# Patient Record
Sex: FEMALE | Race: WHITE | NOT HISPANIC OR LATINO | Employment: FULL TIME | URBAN - METROPOLITAN AREA
[De-identification: names, ages, dates, MRNs, and addresses within clinical notes are randomized per-mention and may not be internally consistent; named-entity substitution may affect disease eponyms.]

---

## 2023-02-23 PROBLEM — B37.2 INTERTRIGINOUS CANDIDIASIS: Status: ACTIVE | Noted: 2023-02-23

## 2023-02-23 PROBLEM — B00.9 HERPES: Status: ACTIVE | Noted: 2023-02-23

## 2023-02-23 PROBLEM — N89.8 VAGINAL IRRITATION: Status: ACTIVE | Noted: 2023-02-23

## 2023-02-23 PROBLEM — N92.6 MISSED PERIOD: Status: ACTIVE | Noted: 2023-02-23

## 2023-02-23 PROBLEM — N92.6 IRREGULAR BLEEDING: Status: ACTIVE | Noted: 2023-02-23

## 2023-02-23 PROBLEM — B95.0 STREPTOCOCCUS INFECTION, GROUP A: Status: ACTIVE | Noted: 2023-02-23

## 2023-02-23 PROBLEM — R19.7 DIARRHEA: Status: ACTIVE | Noted: 2023-02-23

## 2023-02-23 PROBLEM — M95.0 NASAL DEFECT: Status: ACTIVE | Noted: 2023-02-23

## 2023-02-23 PROBLEM — L73.9 FOLLICULITIS: Status: ACTIVE | Noted: 2023-02-23

## 2023-02-23 PROBLEM — S05.00XA CORNEAL ABRASION: Status: ACTIVE | Noted: 2023-02-23

## 2023-02-23 PROBLEM — F41.9 ANXIETY: Status: ACTIVE | Noted: 2023-02-23

## 2023-02-23 PROBLEM — N91.2 AMENORRHEA: Status: ACTIVE | Noted: 2023-02-23

## 2023-02-23 PROBLEM — F41.0 PANIC ATTACKS: Status: ACTIVE | Noted: 2023-02-23

## 2023-02-23 PROBLEM — J45.909 ASTHMA (HHS-HCC): Status: ACTIVE | Noted: 2023-02-23

## 2023-02-23 PROBLEM — A74.9 CHLAMYDIA: Status: ACTIVE | Noted: 2023-02-23

## 2023-02-23 PROBLEM — R10.33: Status: ACTIVE | Noted: 2023-02-23

## 2023-02-23 PROBLEM — R21 RASH: Status: ACTIVE | Noted: 2023-02-23

## 2023-02-23 PROBLEM — N89.8 VAGINAL DISCHARGE: Status: ACTIVE | Noted: 2023-02-23

## 2023-02-23 PROBLEM — J02.9 SORE THROAT: Status: ACTIVE | Noted: 2023-02-23

## 2023-02-23 PROBLEM — B35.4 RINGWORM OF BODY: Status: ACTIVE | Noted: 2023-02-23

## 2023-02-23 PROBLEM — R00.2 PALPITATIONS: Status: ACTIVE | Noted: 2023-02-23

## 2023-02-23 PROBLEM — K21.9 ACID REFLUX DISEASE: Status: ACTIVE | Noted: 2023-02-23

## 2023-02-23 RX ORDER — CLOTRIMAZOLE AND BETAMETHASONE DIPROPIONATE 10; .64 MG/G; MG/G
1 CREAM TOPICAL 3 TIMES DAILY
COMMUNITY
End: 2023-03-31 | Stop reason: SDUPTHER

## 2023-02-23 RX ORDER — ALBUTEROL SULFATE 90 UG/1
1-2 AEROSOL, METERED RESPIRATORY (INHALATION)
COMMUNITY
Start: 2022-04-11

## 2023-02-23 RX ORDER — DEXAMETHASONE 6 MG/1
6 TABLET ORAL DAILY
COMMUNITY
End: 2023-03-31

## 2023-03-22 ENCOUNTER — APPOINTMENT (OUTPATIENT)
Dept: PRIMARY CARE | Facility: CLINIC | Age: 28
End: 2023-03-22

## 2023-03-31 ENCOUNTER — OFFICE VISIT (OUTPATIENT)
Dept: PRIMARY CARE | Facility: CLINIC | Age: 28
End: 2023-03-31
Payer: MEDICAID

## 2023-03-31 VITALS
BODY MASS INDEX: 36.76 KG/M2 | SYSTOLIC BLOOD PRESSURE: 110 MMHG | OXYGEN SATURATION: 98 % | DIASTOLIC BLOOD PRESSURE: 70 MMHG | HEIGHT: 70 IN | WEIGHT: 256.8 LBS | RESPIRATION RATE: 18 BRPM | HEART RATE: 86 BPM

## 2023-03-31 DIAGNOSIS — J45.20 MILD INTERMITTENT ASTHMA, UNSPECIFIED WHETHER COMPLICATED (HHS-HCC): ICD-10-CM

## 2023-03-31 DIAGNOSIS — R73.01 ELEVATED FASTING BLOOD SUGAR: ICD-10-CM

## 2023-03-31 DIAGNOSIS — F41.0 PANIC ATTACKS: ICD-10-CM

## 2023-03-31 DIAGNOSIS — R07.89 OTHER CHEST PAIN: ICD-10-CM

## 2023-03-31 DIAGNOSIS — B35.9 RINGWORM: ICD-10-CM

## 2023-03-31 DIAGNOSIS — Z13.220 SCREENING FOR HYPERLIPIDEMIA: ICD-10-CM

## 2023-03-31 DIAGNOSIS — E55.9 VITAMIN D DEFICIENCY: Primary | ICD-10-CM

## 2023-03-31 DIAGNOSIS — Z13.29 SCREENING FOR HYPOTHYROIDISM: ICD-10-CM

## 2023-03-31 DIAGNOSIS — R07.9 CHEST PAIN, UNSPECIFIED TYPE: ICD-10-CM

## 2023-03-31 DIAGNOSIS — F41.9 ANXIETY: ICD-10-CM

## 2023-03-31 DIAGNOSIS — Z09 FOLLOW UP: ICD-10-CM

## 2023-03-31 DIAGNOSIS — E53.8 VITAMIN B 12 DEFICIENCY: ICD-10-CM

## 2023-03-31 PROBLEM — B00.9 HERPES: Status: RESOLVED | Noted: 2023-02-23 | Resolved: 2023-03-31

## 2023-03-31 PROBLEM — B37.2 INTERTRIGINOUS CANDIDIASIS: Status: RESOLVED | Noted: 2023-02-23 | Resolved: 2023-03-31

## 2023-03-31 PROBLEM — N89.8 VAGINAL IRRITATION: Status: RESOLVED | Noted: 2023-02-23 | Resolved: 2023-03-31

## 2023-03-31 PROBLEM — R21 RASH: Status: RESOLVED | Noted: 2023-02-23 | Resolved: 2023-03-31

## 2023-03-31 PROBLEM — A74.9 CHLAMYDIA: Status: RESOLVED | Noted: 2023-02-23 | Resolved: 2023-03-31

## 2023-03-31 PROBLEM — J02.9 SORE THROAT: Status: RESOLVED | Noted: 2023-02-23 | Resolved: 2023-03-31

## 2023-03-31 PROBLEM — B35.4 RINGWORM OF BODY: Status: RESOLVED | Noted: 2023-02-23 | Resolved: 2023-03-31

## 2023-03-31 PROBLEM — B95.0 STREPTOCOCCUS INFECTION, GROUP A: Status: RESOLVED | Noted: 2023-02-23 | Resolved: 2023-03-31

## 2023-03-31 PROBLEM — K21.9 ACID REFLUX DISEASE: Status: RESOLVED | Noted: 2023-02-23 | Resolved: 2023-03-31

## 2023-03-31 PROBLEM — L73.9 FOLLICULITIS: Status: RESOLVED | Noted: 2023-02-23 | Resolved: 2023-03-31

## 2023-03-31 PROBLEM — S05.00XA CORNEAL ABRASION: Status: RESOLVED | Noted: 2023-02-23 | Resolved: 2023-03-31

## 2023-03-31 PROBLEM — R10.33: Status: RESOLVED | Noted: 2023-02-23 | Resolved: 2023-03-31

## 2023-03-31 PROBLEM — N89.8 VAGINAL DISCHARGE: Status: RESOLVED | Noted: 2023-02-23 | Resolved: 2023-03-31

## 2023-03-31 PROBLEM — M95.0 NASAL DEFECT: Status: RESOLVED | Noted: 2023-02-23 | Resolved: 2023-03-31

## 2023-03-31 PROBLEM — N92.6 MISSED PERIOD: Status: RESOLVED | Noted: 2023-02-23 | Resolved: 2023-03-31

## 2023-03-31 PROBLEM — N91.2 AMENORRHEA: Status: RESOLVED | Noted: 2023-02-23 | Resolved: 2023-03-31

## 2023-03-31 PROBLEM — R19.7 DIARRHEA: Status: RESOLVED | Noted: 2023-02-23 | Resolved: 2023-03-31

## 2023-03-31 PROBLEM — R00.2 PALPITATIONS: Status: RESOLVED | Noted: 2023-02-23 | Resolved: 2023-03-31

## 2023-03-31 PROBLEM — N92.6 IRREGULAR BLEEDING: Status: RESOLVED | Noted: 2023-02-23 | Resolved: 2023-03-31

## 2023-03-31 PROCEDURE — 99214 OFFICE O/P EST MOD 30 MIN: CPT

## 2023-03-31 PROCEDURE — 1036F TOBACCO NON-USER: CPT

## 2023-03-31 RX ORDER — CLOTRIMAZOLE AND BETAMETHASONE DIPROPIONATE 10; .64 MG/G; MG/G
1 CREAM TOPICAL 3 TIMES DAILY
Qty: 10 G | Refills: 0 | Status: SHIPPED | OUTPATIENT
Start: 2023-03-31

## 2023-03-31 ASSESSMENT — ENCOUNTER SYMPTOMS
GASTROINTESTINAL NEGATIVE: 1
CARDIOVASCULAR NEGATIVE: 1
NEUROLOGICAL NEGATIVE: 1
ALLERGIC/IMMUNOLOGIC NEGATIVE: 1
PSYCHIATRIC NEGATIVE: 1
EYES NEGATIVE: 1
ENDOCRINE NEGATIVE: 1
MUSCULOSKELETAL NEGATIVE: 1
CONSTITUTIONAL NEGATIVE: 1
HEMATOLOGIC/LYMPHATIC NEGATIVE: 1
RESPIRATORY NEGATIVE: 1

## 2023-03-31 ASSESSMENT — PATIENT HEALTH QUESTIONNAIRE - PHQ9
SUM OF ALL RESPONSES TO PHQ9 QUESTIONS 1 AND 2: 0
2. FEELING DOWN, DEPRESSED OR HOPELESS: NOT AT ALL
1. LITTLE INTEREST OR PLEASURE IN DOING THINGS: NOT AT ALL

## 2023-03-31 NOTE — PROGRESS NOTES
"Subjective   Patient ID: Ag Rodriguez is a 27 y.o. female who presents for Follow-up.    HPI a 27-year-old female with past medical history of chronic chest pain, ringworm, anxiety and panic attacks, and asthma arrives to the clinic with chief complaint of patient establishment.  Patient is here to establish with a new provider today.  She has not been seen in this office in over a year.  She does have a few concerns that she would like to address today.  She does have a history of ringworm and is wondering if she can get a refill on her prescription cream medication.  She does use a local tanning bed and reports that she would always get these \"spots\" after using it.  She is also requesting a letter for an emotional support dog letter so that her animal can stay at her apartment.  She also reports of having chronic chest pain that is midsternal that would happen at random times.  She denies any illicit drug use and caffeine.  She does not have any history of any cardiovascular disease.  She does have a history of anxiety and panic attacks.  She reports that this is not related to that.  She is here for further evaluation and health maintenance.  Patient denies any chest pain, shortness of breath, diarrhea, fevers, chills, head pain, COVID-like symptoms.    Review of Systems   Constitutional: Negative.    HENT: Negative.     Eyes: Negative.    Respiratory: Negative.     Cardiovascular: Negative.    Gastrointestinal: Negative.    Endocrine: Negative.    Genitourinary: Negative.    Musculoskeletal: Negative.    Skin: Negative.    Allergic/Immunologic: Negative.    Neurological: Negative.    Hematological: Negative.    Psychiatric/Behavioral: Negative.     All other systems reviewed and are negative.      Objective   /70 (BP Location: Right arm)   Pulse 86   Resp 18   Ht 1.778 m (5' 10\")   Wt 116 kg (256 lb 12.8 oz)   SpO2 98%   BMI 36.85 kg/m²     Physical Exam  Vitals and nursing note reviewed. "   Constitutional:       Appearance: Normal appearance.   HENT:      Head: Normocephalic and atraumatic.      Right Ear: Tympanic membrane normal.      Left Ear: Tympanic membrane normal.      Nose: Nose normal.      Mouth/Throat:      Mouth: Mucous membranes are moist.      Pharynx: Oropharynx is clear.   Eyes:      Extraocular Movements: Extraocular movements intact.      Conjunctiva/sclera: Conjunctivae normal.      Pupils: Pupils are equal, round, and reactive to light.   Cardiovascular:      Rate and Rhythm: Normal rate and regular rhythm.   Pulmonary:      Effort: Pulmonary effort is normal.      Breath sounds: Normal breath sounds.   Abdominal:      General: Bowel sounds are normal.      Palpations: Abdomen is soft.          Comments: Ring worm noted.    Musculoskeletal:         General: Normal range of motion.      Cervical back: Normal range of motion and neck supple.   Skin:     General: Skin is warm.      Capillary Refill: Capillary refill takes less than 2 seconds.   Neurological:      General: No focal deficit present.      Mental Status: She is alert and oriented to person, place, and time. Mental status is at baseline.   Psychiatric:         Mood and Affect: Mood normal.         Behavior: Behavior normal.         Thought Content: Thought content normal.         Judgment: Judgment normal.         Assessment/Plan   Problem List Items Addressed This Visit          Respiratory    Asthma     Continue albuterol inhaler 1 to 2 puffs every 4-6 hours as needed.            Infectious/Inflammatory    Ringworm     You report getting ringworm and having a history of ringworm after using the local tanning bed.  I have sent over your clotrimazole betamethasone cream as requested.  I discussed about healthy hand and body hygiene.         Relevant Medications    clotrimazole-betamethasone (Lotrisone) cream       Other    Anxiety     You report having anxiety and panic attacks.  You do not want to take any medications.   I did write your dog emotional support letter today as requested.         Panic attacks     You report having anxiety and panic attacks.  You do not want to take any medications.  I did write your dog emotional support letter today as requested.         Other chest pain     I have ordered blood work for you to complete.  I did offer an EKG and you declined at this time since you report that your chest pain has been ongoing for the last 3 months.  He also report that you have gained weight and you do not know if the chest pain is related to your breast getting bigger.  I also ordered a Holter monitor to monitor your heart rate and rhythm for 24 to 48 hours.  Please pick that up today.  I will reevaluate your Holter monitor in 2 to 3 weeks.          Other Visit Diagnoses       Vitamin D deficiency    -  Primary    Relevant Orders    Comprehensive Metabolic Panel    CBC    Vitamin D, Total    Vitamin B12    Vitamin B 12 deficiency        Relevant Orders    Comprehensive Metabolic Panel    Vitamin B12    Elevated fasting blood sugar        Relevant Orders    Comprehensive Metabolic Panel    Hemoglobin A1C    Screening for hyperlipidemia        Relevant Orders    Lipid Panel    Screening for hypothyroidism        Relevant Orders    TSH with reflex to Free T4 if abnormal    Chest pain, unspecified type        Relevant Orders    Holter or Event Cardiac Monitor    Follow up        Relevant Orders    Follow Up In [facility] - PCP          It was a pleasure seeing in the office today.    Follow-up in 2 months.  Please complete the Holter monitor and blood work prior to this appointment.  These labs require you to fast.    I also advised you to follow low fat diet and exercise for at least 30 minutes daily.    Anticipatory guidance, age appropriate vaccines, screening exams, health promotion and prevention discussed.    This document was generated using the assistance of voice recognition software. If there are any  errors of spelling, grammar, syntax, or meaning; please feel free to contact me directly for clarification.

## 2023-03-31 NOTE — LETTER
To Whom It May Concern:       Ag Rodriguez is my patient, and has been under my care for many years.   I am very familiar with her history and with the functional limitations imposed by her emotional related illness.    Due to this disability, this patient has certain limitations related to coping with stress and anxiety. In order to help alleviate these difficulties, and to enhance his/her ability to function independently, I have prescribed this patient to obtain a pet or emotional support animal. The presence of this animal is necessary for the patient's emotional and mental health because its presence will mitigate the symptoms he/she is currently experiencing.    I am licensed by the [facility] to practice medicine and specialize in Family Medicine My license number is [id number]    Sincerely,        Viky BARRERA

## 2023-03-31 NOTE — PROGRESS NOTES
Prev alessandra pt to establish.    Patient needs a letter for emotional support dog.  Alessandra did tell her she has anxiety and may be having panic attachs.     2. Patient states she has chest pain-she has had it for years.  she thinks it may be because her breasts are large and states when she lifts them she can breath better.     Rash on left hip area. She has had this in past on other areas of body- previous cream worked well.

## 2023-03-31 NOTE — ASSESSMENT & PLAN NOTE
You report having anxiety and panic attacks.  You do not want to take any medications.  I did write your dog emotional support letter today as requested.

## 2023-03-31 NOTE — PATIENT INSTRUCTIONS
2 months with fasting labs    Cream for Ring Worm was sent to your pharmacy in [place].     Dog letter was printed out for you today as well     Holter monitor today as well.

## 2023-03-31 NOTE — ASSESSMENT & PLAN NOTE
You report getting ringworm and having a history of ringworm after using the local tanning bed.  I have sent over your clotrimazole betamethasone cream as requested.  I discussed about healthy hand and body hygiene.

## 2023-03-31 NOTE — ASSESSMENT & PLAN NOTE
I have ordered blood work for you to complete.  I did offer an EKG and you declined at this time since you report that your chest pain has been ongoing for the last 3 months.  He also report that you have gained weight and you do not know if the chest pain is related to your breast getting bigger.  I also ordered a Holter monitor to monitor your heart rate and rhythm for 24 to 48 hours.  Please pick that up today.  I will reevaluate your Holter monitor in 2 to 3 weeks.

## 2023-04-01 ENCOUNTER — LAB (OUTPATIENT)
Dept: LAB | Facility: LAB | Age: 28
End: 2023-04-01
Payer: MEDICAID

## 2023-04-01 DIAGNOSIS — E53.8 VITAMIN B 12 DEFICIENCY: ICD-10-CM

## 2023-04-01 DIAGNOSIS — Z13.220 SCREENING FOR HYPERLIPIDEMIA: ICD-10-CM

## 2023-04-01 DIAGNOSIS — E55.9 VITAMIN D DEFICIENCY: ICD-10-CM

## 2023-04-01 DIAGNOSIS — R73.01 ELEVATED FASTING BLOOD SUGAR: ICD-10-CM

## 2023-04-01 DIAGNOSIS — Z13.29 SCREENING FOR HYPOTHYROIDISM: ICD-10-CM

## 2023-04-01 LAB
ALANINE AMINOTRANSFERASE (SGPT) (U/L) IN SER/PLAS: 21 U/L (ref 7–45)
ALBUMIN (G/DL) IN SER/PLAS: 4.2 G/DL (ref 3.4–5)
ALKALINE PHOSPHATASE (U/L) IN SER/PLAS: 64 U/L (ref 33–110)
ANION GAP IN SER/PLAS: 12 MMOL/L (ref 10–20)
ASPARTATE AMINOTRANSFERASE (SGOT) (U/L) IN SER/PLAS: 15 U/L (ref 9–39)
BILIRUBIN TOTAL (MG/DL) IN SER/PLAS: 0.5 MG/DL (ref 0–1.2)
CALCIDIOL (25 OH VITAMIN D3) (NG/ML) IN SER/PLAS: 28 NG/ML
CALCIUM (MG/DL) IN SER/PLAS: 9 MG/DL (ref 8.6–10.3)
CARBON DIOXIDE, TOTAL (MMOL/L) IN SER/PLAS: 25 MMOL/L (ref 21–32)
CHLORIDE (MMOL/L) IN SER/PLAS: 107 MMOL/L (ref 98–107)
CHOLESTEROL (MG/DL) IN SER/PLAS: 188 MG/DL (ref 0–199)
CHOLESTEROL IN HDL (MG/DL) IN SER/PLAS: 45.3 MG/DL
CHOLESTEROL/HDL RATIO: 4.2
COBALAMIN (VITAMIN B12) (PG/ML) IN SER/PLAS: 185 PG/ML (ref 211–911)
CREATININE (MG/DL) IN SER/PLAS: 0.62 MG/DL (ref 0.5–1.05)
ERYTHROCYTE DISTRIBUTION WIDTH (RATIO) BY AUTOMATED COUNT: 13.6 % (ref 11.5–14.5)
ERYTHROCYTE MEAN CORPUSCULAR HEMOGLOBIN CONCENTRATION (G/DL) BY AUTOMATED: 31.8 G/DL (ref 32–36)
ERYTHROCYTE MEAN CORPUSCULAR VOLUME (FL) BY AUTOMATED COUNT: 88 FL (ref 80–100)
ERYTHROCYTES (10*6/UL) IN BLOOD BY AUTOMATED COUNT: 4.71 X10E12/L (ref 4–5.2)
ESTIMATED AVERAGE GLUCOSE FOR HBA1C: 105 MG/DL
GFR FEMALE: >90 ML/MIN/1.73M2
GLUCOSE (MG/DL) IN SER/PLAS: 86 MG/DL (ref 74–99)
HEMATOCRIT (%) IN BLOOD BY AUTOMATED COUNT: 41.5 % (ref 36–46)
HEMOGLOBIN (G/DL) IN BLOOD: 13.2 G/DL (ref 12–16)
HEMOGLOBIN A1C/HEMOGLOBIN TOTAL IN BLOOD: 5.3 %
LDL: 118 MG/DL (ref 0–99)
LEUKOCYTES (10*3/UL) IN BLOOD BY AUTOMATED COUNT: 7.1 X10E9/L (ref 4.4–11.3)
PLATELETS (10*3/UL) IN BLOOD AUTOMATED COUNT: 313 X10E9/L (ref 150–450)
POTASSIUM (MMOL/L) IN SER/PLAS: 4.3 MMOL/L (ref 3.5–5.3)
PROTEIN TOTAL: 6.5 G/DL (ref 6.4–8.2)
SODIUM (MMOL/L) IN SER/PLAS: 140 MMOL/L (ref 136–145)
THYROTROPIN (MIU/L) IN SER/PLAS BY DETECTION LIMIT <= 0.05 MIU/L: 2.44 MIU/L (ref 0.44–3.98)
TRIGLYCERIDE (MG/DL) IN SER/PLAS: 126 MG/DL (ref 0–149)
UREA NITROGEN (MG/DL) IN SER/PLAS: 8 MG/DL (ref 6–23)
VLDL: 25 MG/DL (ref 0–40)

## 2023-04-01 PROCEDURE — 82306 VITAMIN D 25 HYDROXY: CPT

## 2023-04-01 PROCEDURE — 36415 COLL VENOUS BLD VENIPUNCTURE: CPT

## 2023-04-01 PROCEDURE — 80053 COMPREHEN METABOLIC PANEL: CPT

## 2023-04-01 PROCEDURE — 80061 LIPID PANEL: CPT

## 2023-04-01 PROCEDURE — 84443 ASSAY THYROID STIM HORMONE: CPT

## 2023-04-01 PROCEDURE — 85027 COMPLETE CBC AUTOMATED: CPT

## 2023-04-01 PROCEDURE — 83036 HEMOGLOBIN GLYCOSYLATED A1C: CPT

## 2023-04-01 PROCEDURE — 82607 VITAMIN B-12: CPT

## 2023-04-18 LAB
BASOPHILS (10*3/UL) IN BLOOD BY AUTOMATED COUNT: 0.06 X10E9/L (ref 0–0.1)
BASOPHILS/100 LEUKOCYTES IN BLOOD BY AUTOMATED COUNT: 0.5 % (ref 0–2)
C REACTIVE PROTEIN (MG/L) IN SER/PLAS: 0.61 MG/DL
EOSINOPHILS (10*3/UL) IN BLOOD BY AUTOMATED COUNT: 0.04 X10E9/L (ref 0–0.7)
EOSINOPHILS/100 LEUKOCYTES IN BLOOD BY AUTOMATED COUNT: 0.3 % (ref 0–6)
ERYTHROCYTE DISTRIBUTION WIDTH (RATIO) BY AUTOMATED COUNT: 13.6 % (ref 11.5–14.5)
ERYTHROCYTE MEAN CORPUSCULAR HEMOGLOBIN CONCENTRATION (G/DL) BY AUTOMATED: 32.5 G/DL (ref 32–36)
ERYTHROCYTE MEAN CORPUSCULAR VOLUME (FL) BY AUTOMATED COUNT: 89 FL (ref 80–100)
ERYTHROCYTES (10*6/UL) IN BLOOD BY AUTOMATED COUNT: 4.69 X10E12/L (ref 4–5.2)
HEMATOCRIT (%) IN BLOOD BY AUTOMATED COUNT: 41.9 % (ref 36–46)
HEMOGLOBIN (G/DL) IN BLOOD: 13.6 G/DL (ref 12–16)
IMMATURE GRANULOCYTES/100 LEUKOCYTES IN BLOOD BY AUTOMATED COUNT: 0.3 % (ref 0–0.9)
LEUKOCYTES (10*3/UL) IN BLOOD BY AUTOMATED COUNT: 12.4 X10E9/L (ref 4.4–11.3)
LYMPHOCYTES (10*3/UL) IN BLOOD BY AUTOMATED COUNT: 1.23 X10E9/L (ref 1.2–4.8)
LYMPHOCYTES/100 LEUKOCYTES IN BLOOD BY AUTOMATED COUNT: 10 % (ref 13–44)
MONOCYTES (10*3/UL) IN BLOOD BY AUTOMATED COUNT: 0.29 X10E9/L (ref 0.1–1)
MONOCYTES/100 LEUKOCYTES IN BLOOD BY AUTOMATED COUNT: 2.3 % (ref 2–10)
NEUTROPHILS (10*3/UL) IN BLOOD BY AUTOMATED COUNT: 10.69 X10E9/L (ref 1.2–7.7)
NEUTROPHILS/100 LEUKOCYTES IN BLOOD BY AUTOMATED COUNT: 86.6 % (ref 40–80)
PLATELETS (10*3/UL) IN BLOOD AUTOMATED COUNT: 354 X10E9/L (ref 150–450)
SEDIMENTATION RATE, ERYTHROCYTE: 22 MM/H (ref 0–20)
URATE (MG/DL) IN SER/PLAS: 5.7 MG/DL (ref 2.3–6.7)

## 2023-04-19 LAB
ANTI-NUCLEAR ANTIBODY (ANA): NEGATIVE
RHEUMATOID FACTOR (IU/ML) IN SERUM OR PLASMA: <10 IU/ML (ref 0–15)

## 2023-06-01 ENCOUNTER — APPOINTMENT (OUTPATIENT)
Dept: PRIMARY CARE | Facility: CLINIC | Age: 28
End: 2023-06-01
Payer: MEDICAID

## 2023-06-02 LAB
CHLAMYDIA TRACH., AMPLIFIED: NEGATIVE
CLUE CELLS: NORMAL
N. GONORRHEA, AMPLIFIED: NEGATIVE
NUGENT SCORE: 1
TRICHOMONAS VAGINALIS: NEGATIVE
YEAST: NORMAL

## 2023-07-07 LAB
CHLAMYDIA TRACH., AMPLIFIED: NEGATIVE
N. GONORRHEA, AMPLIFIED: NEGATIVE
TRICHOMONAS VAGINALIS: NEGATIVE

## 2023-07-10 LAB
GENITAL CULTURE: ABNORMAL
GENITAL CULTURE: ABNORMAL

## 2023-10-09 ENCOUNTER — HOSPITAL ENCOUNTER (EMERGENCY)
Facility: HOSPITAL | Age: 28
Discharge: HOME | End: 2023-10-09
Attending: STUDENT IN AN ORGANIZED HEALTH CARE EDUCATION/TRAINING PROGRAM | Admitting: STUDENT IN AN ORGANIZED HEALTH CARE EDUCATION/TRAINING PROGRAM
Payer: MEDICAID

## 2023-10-09 ENCOUNTER — PHARMACY VISIT (OUTPATIENT)
Dept: PHARMACY | Facility: CLINIC | Age: 28
End: 2023-10-09
Payer: MEDICAID

## 2023-10-09 ENCOUNTER — APPOINTMENT (OUTPATIENT)
Dept: RADIOLOGY | Facility: HOSPITAL | Age: 28
End: 2023-10-09
Payer: MEDICAID

## 2023-10-09 VITALS
HEART RATE: 60 BPM | BODY MASS INDEX: 36.37 KG/M2 | HEIGHT: 68 IN | SYSTOLIC BLOOD PRESSURE: 141 MMHG | TEMPERATURE: 99.2 F | WEIGHT: 240 LBS | DIASTOLIC BLOOD PRESSURE: 87 MMHG | RESPIRATION RATE: 18 BRPM | OXYGEN SATURATION: 99 %

## 2023-10-09 DIAGNOSIS — F41.8 ANXIETY ABOUT HEALTH: ICD-10-CM

## 2023-10-09 DIAGNOSIS — R51.9 ACUTE NONINTRACTABLE HEADACHE, UNSPECIFIED HEADACHE TYPE: Primary | ICD-10-CM

## 2023-10-09 LAB
ANION GAP SERPL CALC-SCNC: 11 MMOL/L (ref 10–20)
BASOPHILS # BLD AUTO: 0.04 X10*3/UL (ref 0–0.1)
BASOPHILS NFR BLD AUTO: 0.5 %
BUN SERPL-MCNC: 8 MG/DL (ref 6–23)
CALCIUM SERPL-MCNC: 9 MG/DL (ref 8.6–10.3)
CHLORIDE SERPL-SCNC: 106 MMOL/L (ref 98–107)
CO2 SERPL-SCNC: 25 MMOL/L (ref 21–32)
CREAT SERPL-MCNC: 0.58 MG/DL (ref 0.5–1.05)
EOSINOPHIL # BLD AUTO: 0.13 X10*3/UL (ref 0–0.7)
EOSINOPHIL NFR BLD AUTO: 1.8 %
ERYTHROCYTE [DISTWIDTH] IN BLOOD BY AUTOMATED COUNT: 13.9 % (ref 11.5–14.5)
GFR SERPL CREATININE-BSD FRML MDRD: >90 ML/MIN/1.73M*2
GLUCOSE SERPL-MCNC: 94 MG/DL (ref 74–99)
HCT VFR BLD AUTO: 41.3 % (ref 36–46)
HGB BLD-MCNC: 13.5 G/DL (ref 12–16)
IMM GRANULOCYTES # BLD AUTO: 0.03 X10*3/UL (ref 0–0.7)
IMM GRANULOCYTES NFR BLD AUTO: 0.4 % (ref 0–0.9)
LYMPHOCYTES # BLD AUTO: 2.24 X10*3/UL (ref 1.2–4.8)
LYMPHOCYTES NFR BLD AUTO: 30.7 %
MCH RBC QN AUTO: 28.8 PG (ref 26–34)
MCHC RBC AUTO-ENTMCNC: 32.7 G/DL (ref 32–36)
MCV RBC AUTO: 88 FL (ref 80–100)
MONOCYTES # BLD AUTO: 0.62 X10*3/UL (ref 0.1–1)
MONOCYTES NFR BLD AUTO: 8.5 %
NEUTROPHILS # BLD AUTO: 4.23 X10*3/UL (ref 1.2–7.7)
NEUTROPHILS NFR BLD AUTO: 58.1 %
NRBC BLD-RTO: 0 /100 WBCS (ref 0–0)
PLATELET # BLD AUTO: 335 X10*3/UL (ref 150–450)
PMV BLD AUTO: 10.8 FL (ref 7.5–11.5)
POTASSIUM SERPL-SCNC: 4.3 MMOL/L (ref 3.5–5.3)
RBC # BLD AUTO: 4.68 X10*6/UL (ref 4–5.2)
SODIUM SERPL-SCNC: 138 MMOL/L (ref 136–145)
WBC # BLD AUTO: 7.3 X10*3/UL (ref 4.4–11.3)

## 2023-10-09 PROCEDURE — 96361 HYDRATE IV INFUSION ADD-ON: CPT

## 2023-10-09 PROCEDURE — 80048 BASIC METABOLIC PNL TOTAL CA: CPT | Performed by: NURSE PRACTITIONER

## 2023-10-09 PROCEDURE — RXMED WILLOW AMBULATORY MEDICATION CHARGE

## 2023-10-09 PROCEDURE — 36415 COLL VENOUS BLD VENIPUNCTURE: CPT | Performed by: NURSE PRACTITIONER

## 2023-10-09 PROCEDURE — 2500000001 HC RX 250 WO HCPCS SELF ADMINISTERED DRUGS (ALT 637 FOR MEDICARE OP): Performed by: STUDENT IN AN ORGANIZED HEALTH CARE EDUCATION/TRAINING PROGRAM

## 2023-10-09 PROCEDURE — 85025 COMPLETE CBC W/AUTO DIFF WBC: CPT | Performed by: NURSE PRACTITIONER

## 2023-10-09 PROCEDURE — 70450 CT HEAD/BRAIN W/O DYE: CPT

## 2023-10-09 PROCEDURE — 2500000004 HC RX 250 GENERAL PHARMACY W/ HCPCS (ALT 636 FOR OP/ED): Performed by: NURSE PRACTITIONER

## 2023-10-09 PROCEDURE — 70450 CT HEAD/BRAIN W/O DYE: CPT | Performed by: RADIOLOGY

## 2023-10-09 PROCEDURE — 96374 THER/PROPH/DIAG INJ IV PUSH: CPT

## 2023-10-09 PROCEDURE — 99284 EMERGENCY DEPT VISIT MOD MDM: CPT | Mod: 25

## 2023-10-09 RX ORDER — METOCLOPRAMIDE 5 MG/1
10 TABLET ORAL ONCE
Status: COMPLETED | OUTPATIENT
Start: 2023-10-09 | End: 2023-10-09

## 2023-10-09 RX ORDER — BUTALBITAL, ACETAMINOPHEN AND CAFFEINE 50; 325; 40 MG/1; MG/1; MG/1
1 TABLET ORAL EVERY 6 HOURS PRN
Qty: 12 TABLET | Refills: 0 | Status: SHIPPED | OUTPATIENT
Start: 2023-10-09

## 2023-10-09 RX ORDER — METOCLOPRAMIDE 10 MG/1
10 TABLET ORAL EVERY 8 HOURS PRN
Qty: 20 TABLET | Refills: 0 | Status: SHIPPED | OUTPATIENT
Start: 2023-10-09 | End: 2023-10-16

## 2023-10-09 RX ORDER — IBUPROFEN 600 MG/1
600 TABLET ORAL EVERY 6 HOURS PRN
Qty: 20 TABLET | Refills: 0 | Status: SHIPPED | OUTPATIENT
Start: 2023-10-09 | End: 2023-10-14

## 2023-10-09 RX ORDER — METOCLOPRAMIDE HYDROCHLORIDE 5 MG/ML
10 INJECTION INTRAMUSCULAR; INTRAVENOUS ONCE
Status: COMPLETED | OUTPATIENT
Start: 2023-10-09 | End: 2023-10-09

## 2023-10-09 RX ORDER — NAPROXEN 250 MG/1
500 TABLET ORAL ONCE
Status: COMPLETED | OUTPATIENT
Start: 2023-10-09 | End: 2023-10-09

## 2023-10-09 RX ADMIN — SODIUM CHLORIDE 1000 ML: 9 INJECTION, SOLUTION INTRAVENOUS at 11:16

## 2023-10-09 RX ADMIN — NAPROXEN 500 MG: 250 TABLET ORAL at 13:48

## 2023-10-09 RX ADMIN — METOCLOPRAMIDE 10 MG: 5 TABLET ORAL at 13:47

## 2023-10-09 ASSESSMENT — COLUMBIA-SUICIDE SEVERITY RATING SCALE - C-SSRS
1. IN THE PAST MONTH, HAVE YOU WISHED YOU WERE DEAD OR WISHED YOU COULD GO TO SLEEP AND NOT WAKE UP?: NO
6. HAVE YOU EVER DONE ANYTHING, STARTED TO DO ANYTHING, OR PREPARED TO DO ANYTHING TO END YOUR LIFE?: NO
2. HAVE YOU ACTUALLY HAD ANY THOUGHTS OF KILLING YOURSELF?: NO

## 2023-10-09 ASSESSMENT — LIFESTYLE VARIABLES
HAVE YOU EVER FELT YOU SHOULD CUT DOWN ON YOUR DRINKING: NO
EVER HAD A DRINK FIRST THING IN THE MORNING TO STEADY YOUR NERVES TO GET RID OF A HANGOVER: NO
EVER FELT BAD OR GUILTY ABOUT YOUR DRINKING: NO
HAVE PEOPLE ANNOYED YOU BY CRITICIZING YOUR DRINKING: NO

## 2023-10-09 ASSESSMENT — PAIN SCALES - GENERAL
PAINLEVEL_OUTOF10: 0 - NO PAIN
PAINLEVEL_OUTOF10: 2

## 2023-10-09 ASSESSMENT — PAIN - FUNCTIONAL ASSESSMENT
PAIN_FUNCTIONAL_ASSESSMENT: 0-10
PAIN_FUNCTIONAL_ASSESSMENT: 0-10

## 2023-10-09 ASSESSMENT — PAIN DESCRIPTION - PROGRESSION: CLINICAL_PROGRESSION: OTHER (COMMENT)

## 2023-10-09 ASSESSMENT — VISUAL ACUITY: OU: 1

## 2023-10-09 NOTE — Clinical Note
Ag Rodriguez was seen and treated in our emergency department on 10/9/2023.  She may return to work on 10/11/2023.       If you have any questions or concerns, please don't hesitate to call.      Meaghan Stevens, APRN-CNP

## 2023-10-09 NOTE — ED PROVIDER NOTES
HPI   Chief Complaint   Patient presents with    Headache     Pt. Reports chronic headaches with recent increase in severity this AM.        Patient presents to the emergency department for evaluation of headaches that have been worsening since June.  She has been evaluated by an ear nose and throat provider and was sent for a CT however was canceled due to insurance.  Patient states she has a new appointment for Friday however she gets very panicky when the headaches come on.  She states is a sharp stabbing pain typically on the right side of her head that makes her see flashes of light in lines in her vision, she gets very sensitive to sound and nauseated without vomiting.  There is been no loss of consciousness, diplopia, loss of vision, seizure activity, head injury, fever, numbness, weakness, ataxia, dysphagia or facial changes.  Patient denies a family history of brain aneurysm or stroke.  She has never been diagnosed with migraines.  She states she has bad anxiety and panic attacks which are not managed and this freaks her out.  She has concerned waiting until Friday and came to the emergency department to get checked.  She has been taking over-the-counter Tylenol and recently started taking Excedrin Migraine with improvement.  She took Excedrin Migraine before coming to the emergency department and her headache is currently 3 out of 10 and manageable.  She denies anticoagulant use and drove herself to the ED today.  Her symptoms are currently mild in severity.  Overall symptoms are intermittent in severity.  Today's onset was gradual, no thunderclap and persistent, starting about 3 hours ago.      History provided by:  Patient   used: No                          No data recorded       NIH Stroke Scale: 0          Patient History   Past Medical History:   Diagnosis Date    Personal history of other infectious and parasitic diseases     History of sexually transmitted disease     "Personal history of other mental and behavioral disorders     History of anxiety     Past Surgical History:   Procedure Laterality Date    OTHER SURGICAL HISTORY  2020    Rhinologic surgery     Family History   Problem Relation Name Age of Onset    Anxiety disorder Mother      Depression Mother      Other (substance use disorder) Mother      Other (substance use disorder) Father      Throat cancer Maternal Grandmother       Social History     Tobacco Use    Smoking status: Former     Packs/day: 0.50     Years: 4.00     Additional pack years: 0.00     Total pack years: 2.00     Types: Cigarettes     Quit date:      Years since quittin.7    Smokeless tobacco: Never   Vaping Use    Vaping Use: Never used   Substance Use Topics    Alcohol use: Not on file    Drug use: Never       Physical Exam   Visit Vitals  /87   Pulse 60   Temp 37.3 °C (99.2 °F)   Resp 18   Ht 1.727 m (5' 8\")   Wt 109 kg (240 lb)   SpO2 99%   BMI 36.49 kg/m²   Smoking Status Former   BSA 2.29 m²      Physical Exam  Vitals and nursing note reviewed.   Constitutional:       General: She is not in acute distress.     Appearance: She is well-developed.   HENT:      Head: Normocephalic and atraumatic. No right periorbital erythema or left periorbital erythema.      Jaw: There is normal jaw occlusion. No pain on movement.      Right Ear: Hearing, ear canal and external ear normal. A middle ear effusion is present. Tympanic membrane is not erythematous or bulging.      Left Ear: Hearing, tympanic membrane, ear canal and external ear normal.      Nose: Rhinorrhea present. Rhinorrhea is clear.      Mouth/Throat:      Lips: Pink.      Mouth: Mucous membranes are moist.      Pharynx: Oropharynx is clear. Uvula midline.   Eyes:      General: Lids are normal. Vision grossly intact.      Conjunctiva/sclera: Conjunctivae normal.      Pupils: Pupils are equal, round, and reactive to light.      Comments: 4mm > 2mm   Cardiovascular:      Rate and " Rhythm: Normal rate and regular rhythm.      Pulses:           Radial pulses are 2+ on the right side and 2+ on the left side.        Posterior tibial pulses are 2+ on the right side and 2+ on the left side.      Heart sounds: No murmur heard.  Pulmonary:      Effort: Pulmonary effort is normal. No respiratory distress.      Breath sounds: Normal breath sounds.   Abdominal:      Palpations: Abdomen is soft.      Tenderness: There is no abdominal tenderness.   Musculoskeletal:         General: No swelling.      Cervical back: Full passive range of motion without pain and neck supple. No rigidity. No spinous process tenderness.      Thoracic back: No bony tenderness.      Lumbar back: No bony tenderness.      Right lower leg: No edema.      Left lower leg: No edema.   Skin:     General: Skin is warm and dry.      Capillary Refill: Capillary refill takes less than 2 seconds.   Neurological:      General: No focal deficit present.      Mental Status: She is alert and oriented to person, place, and time.      Motor: Motor function is intact.      Coordination: Coordination is intact.      Gait: Gait is intact.      Comments: NIHSS 0   Psychiatric:         Mood and Affect: Mood normal.         Behavior: Behavior is cooperative.         CT head wo IV contrast   Final Result   No evidence of acute cortical infarct or intracranial hemorrhage.        No evidence of intracranial hemorrhage or displaced skull fracture.        MACRO:   None        Signed by: Artemio Sanz 10/9/2023 12:39 PM   Dictation workstation:   [id number]          Labs Reviewed   BASIC METABOLIC PANEL - Normal       Result Value    Glucose 94      Sodium 138      Potassium 4.3      Chloride 106      Bicarbonate 25      Anion Gap 11      Urea Nitrogen 8      Creatinine 0.58      eGFR >90      Calcium 9.0     CBC WITH AUTO DIFFERENTIAL    WBC 7.3      nRBC 0.0      RBC 4.68      Hemoglobin 13.5      Hematocrit 41.3      MCV 88      MCH 28.8      MCHC 32.7       RDW 13.9      Platelets 335      MPV 10.8      Neutrophils % 58.1      Immature Granulocytes %, Automated 0.4      Lymphocytes % 30.7      Monocytes % 8.5      Eosinophils % 1.8      Basophils % 0.5      Neutrophils Absolute 4.23      Immature Granulocytes Absolute, Automated 0.03      Lymphocytes Absolute 2.24      Monocytes Absolute 0.62      Eosinophils Absolute 0.13      Basophils Absolute 0.04           ED Course & MDM   ED Course as of 10/09/23 1453   Mon Oct 09, 2023   1320 CT head wo IV contrast  CT normal [NS]   1320 Patient updated on results. She had nursing staff remove IV therefore as previously decided, Toradol IV not given.  She declines Toradol IM.  She has no change in physical exam.  Headache remains 3 out of 10.  We went over her CT result line by line and I provided a written copy.  We discussed her lab results.  She is frustrated and states she has yet to see a doctor.  After discussing my role, discharge treatment plan and referrals offered for primary care, neurology and mental health, patient still seems dissatisfied with her care.  She is amenable to second opinion by ER attending.  Patient to be evaluated by Dr. Arellano. [NA]   1440 Patient significantly improved.  She reports her headache is gone, she appears more calm, no longer crying and verbalizing readiness for discharge home.  I discussed discharge planning again with her now that she is improved.  We discussed adult referral line, follow-up with Dr. Madelyn Wong for neurology and [facility] for mental health and she is appreciative for this.  Home medications as per recommendations by Dr. Arellano and patient to receive meds to beds.  Patient verbalizes understanding. [NA]      ED Course User Index  [NA] Meaghan Stevens, APRN-CNP  [NS] Hector Arellano MD         Diagnoses as of 10/09/23 1453   Acute nonintractable headache, unspecified headache type   Anxiety about health           Medical  Decision Making  Patient presents to the emergency department for evaluation of persistent headaches ongoing for a few months.  She has been evaluated by ENT and has had arrangements for a CT but insurance has postponed it.  She endorses panic attacks when these come on as she does not know why they are happening to her and the symptomatology sound suspicious for migraine with aura.  She has not had diagnostic imaging and given her onset being within 3 hours, no family history of brain aneurysm, will complete a CT of the head without IV contrast to rule out intracranial hemorrhage.  Her NIH stroke scale is 0.  She took Excedrin Migraine prior to arrival and drove herself here in which she declines offer for migraine cocktail and to obtain transportation.  Plan is for a dose of Toradol after CT imaging should there be no intracranial hemorrhage which based on clinical exam is unlikely.  For reassurance, will check baseline labs and patient has no concern for pregnancy as she has not been sexually active for 5 years and has no UTI symptoms therefore she declines urinalysis and pregnancy.  Patient provides consent for the above plan.    Patient unable to tolerate IV VD and was removed prior to receiving Toradol as her CT scan was negative for intracranial hemorrhage or acute finding.  Labs reassuring with no clinical evidence warranting inpatient management.  Patient was anxious and crying and uncomfortable with migraine diagnosis.  I offered her to meet with the  in the emergency department and she declined.  She does not have suicidal or homicidal ideation.  Given her clinical appearance and although remains with no focal neurologic deficit, she was evaluated by ER attending as well.  As per Dr. Arellano recommendation, patient received oral medications for her headache of Reglan and naproxen with resolve of her headache and she is no longer anxious or crying.    Plan is for outpatient treatment and  management with referral to adult referral line to establish primary care, Dr. Madelyn Wong for neurology and [facility] for mental health.  She has no suicidal or homicidal ideation requiring emergent mental health screening today and she declines need to meet with the  today in the emergency department.  She was given Fioricet, Reglan and ibuprofen as needed for management of her headaches while awaiting follow-up.  Patient was given a printout of her CT report so she may follow-up with her ENT is well.  Patient is non-toxic, not hypoxic and appropriate for this outpatient management plan which they prefer. They are encouraged to arrange close follow up as discussed as well as provided in a written handout of discharge instructions. Patient was educated on signs of symptoms to watch for indicative of re-evaluation in the emergency department setting to include any worsening of current symptoms. Patient verbalized understanding of instructions and is amenable to this treatment plan with no other social determinants of health that would obscure this plan. Patient departed ambulatory in stable condition after receiving meds 2 beds.             Your medication list        START taking these medications        Instructions Last Dose Given Next Dose Due   butalbital-acetaminophen-caff -40 mg capsule  Commonly known as: Fioricet      Take 1 capsule by mouth every 6 hours if needed for headaches for up to 3 days.       ibuprofen 600 mg tablet      Take 1 tablet (600 mg) by mouth every 6 hours if needed (pain) for up to 5 days. TAKE WITH FOOD       metoclopramide 10 mg tablet  Commonly known as: Reglan      Take 1 tablet (10 mg) by mouth every 8 hours if needed (headache) for up to 7 days.              ASK your doctor about these medications        Instructions Last Dose Given Next Dose Due   albuterol 90 mcg/actuation inhaler           clotrimazole-betamethasone cream  Commonly known as:  Lotrisone      Apply 1 Application topically in the morning and 1 Application in the evening and 1 Application before bedtime. Apply to affected area.                 Where to Get Your Medications        These medications were sent to [facility]  [address] [id number]      Hours: 8AM to 6PM Mon-Fri, 8AM to 2PM Sat, 8AM to 12PM Sun Phone: [phone]   butalbital-acetaminophen-caff -40 mg capsule  ibuprofen 600 mg tablet  metoclopramide 10 mg tablet         Procedure  Procedures     *This report was transcribed using voice recognition software.  Every effort was made to ensure accuracy; however, inadvertent computerized transcription errors may be present.*  LEONOR Amanda-SHAI  10/09/23         LEONOR Amanda-SHAI  10/09/23 1453       LEONOR Amanda-SHAI  10/09/23 1453

## 2023-10-17 ENCOUNTER — TELEPHONE (OUTPATIENT)
Dept: OBSTETRICS AND GYNECOLOGY | Facility: CLINIC | Age: 28
End: 2023-10-17
Payer: MEDICAID

## 2023-10-17 DIAGNOSIS — B00.9 HERPES: Primary | ICD-10-CM

## 2023-10-17 NOTE — TELEPHONE ENCOUNTER
Attempted to contact patient to confirm dosage and how often she is taking medication,   Left message to call office.

## 2023-10-17 NOTE — TELEPHONE ENCOUNTER
Patient states she is having a flare up from herpes, she states has been taking acyclovir but not helping, she doesn't have any new sexual partner. She would like advised

## 2023-10-18 NOTE — TELEPHONE ENCOUNTER
Patient can continue with the acyclovir 800 as it is working, but I have ordered lidocaine gel for her to apply every 6 hours as needed to help with the pain.

## 2024-05-22 ENCOUNTER — HOSPITAL ENCOUNTER (EMERGENCY)
Facility: HOSPITAL | Age: 29
Discharge: HOME | End: 2024-05-22
Attending: EMERGENCY MEDICINE
Payer: MEDICAID

## 2024-05-22 VITALS
BODY MASS INDEX: 28.63 KG/M2 | WEIGHT: 200 LBS | SYSTOLIC BLOOD PRESSURE: 121 MMHG | DIASTOLIC BLOOD PRESSURE: 73 MMHG | OXYGEN SATURATION: 100 % | RESPIRATION RATE: 19 BRPM | HEIGHT: 70 IN | HEART RATE: 88 BPM | TEMPERATURE: 98.2 F

## 2024-05-22 LAB
APPEARANCE UR: ABNORMAL
BILIRUB UR STRIP.AUTO-MCNC: NEGATIVE MG/DL
CLUE CELLS SPEC QL WET PREP: NORMAL
COLOR UR: ABNORMAL
GLUCOSE UR STRIP.AUTO-MCNC: NORMAL MG/DL
HCG UR QL IA.RAPID: NEGATIVE
KETONES UR STRIP.AUTO-MCNC: ABNORMAL MG/DL
LEUKOCYTE ESTERASE UR QL STRIP.AUTO: NEGATIVE
NITRITE UR QL STRIP.AUTO: NEGATIVE
PH UR STRIP.AUTO: 7 [PH]
PROT UR STRIP.AUTO-MCNC: NEGATIVE MG/DL
RBC # UR STRIP.AUTO: NEGATIVE /UL
SP GR UR STRIP.AUTO: 1.01
T VAGINALIS SPEC QL WET PREP: NORMAL
TRICHOMONAS REFLEX COMMENT: NORMAL
UROBILINOGEN UR STRIP.AUTO-MCNC: NORMAL MG/DL
WBC VAG QL WET PREP: NORMAL
YEAST VAG QL WET PREP: NORMAL

## 2024-05-22 PROCEDURE — 87491 CHLMYD TRACH DNA AMP PROBE: CPT | Mod: PORLAB | Performed by: EMERGENCY MEDICINE

## 2024-05-22 PROCEDURE — 87661 TRICHOMONAS VAGINALIS AMPLIF: CPT | Mod: PORLAB | Performed by: EMERGENCY MEDICINE

## 2024-05-22 PROCEDURE — 87210 SMEAR WET MOUNT SALINE/INK: CPT | Mod: 59 | Performed by: EMERGENCY MEDICINE

## 2024-05-22 PROCEDURE — 81003 URINALYSIS AUTO W/O SCOPE: CPT | Performed by: EMERGENCY MEDICINE

## 2024-05-22 PROCEDURE — 81025 URINE PREGNANCY TEST: CPT | Performed by: EMERGENCY MEDICINE

## 2024-05-22 PROCEDURE — 99283 EMERGENCY DEPT VISIT LOW MDM: CPT

## 2024-05-22 ASSESSMENT — LIFESTYLE VARIABLES
EVER FELT BAD OR GUILTY ABOUT YOUR DRINKING: NO
HAVE YOU EVER FELT YOU SHOULD CUT DOWN ON YOUR DRINKING: NO
TOTAL SCORE: 0
EVER HAD A DRINK FIRST THING IN THE MORNING TO STEADY YOUR NERVES TO GET RID OF A HANGOVER: NO
HAVE PEOPLE ANNOYED YOU BY CRITICIZING YOUR DRINKING: NO

## 2024-05-22 ASSESSMENT — COLUMBIA-SUICIDE SEVERITY RATING SCALE - C-SSRS
1. IN THE PAST MONTH, HAVE YOU WISHED YOU WERE DEAD OR WISHED YOU COULD GO TO SLEEP AND NOT WAKE UP?: NO
2. HAVE YOU ACTUALLY HAD ANY THOUGHTS OF KILLING YOURSELF?: NO
6. HAVE YOU EVER DONE ANYTHING, STARTED TO DO ANYTHING, OR PREPARED TO DO ANYTHING TO END YOUR LIFE?: NO

## 2024-05-22 ASSESSMENT — PAIN DESCRIPTION - LOCATION: LOCATION: PELVIS

## 2024-05-22 ASSESSMENT — PAIN SCALES - GENERAL: PAINLEVEL_OUTOF10: 2

## 2024-05-22 ASSESSMENT — PAIN DESCRIPTION - ORIENTATION: ORIENTATION: RIGHT

## 2024-05-22 ASSESSMENT — PAIN - FUNCTIONAL ASSESSMENT: PAIN_FUNCTIONAL_ASSESSMENT: 0-10

## 2024-05-22 NOTE — ED PROVIDER NOTES
HPI   Chief Complaint   Patient presents with    Vaginal Discharge    Vaginal Itching    Groin Pain    Exposure to STD       Patient presents emergency department for concern of STDs and yeast infection.  Patient reports vaginal discharge for the last few days.  She is used Monistat for 3 days but has not reported any improvement.  She endorses vaginal itching as well and inguinal pain.  She denies any tenderness, pain with sex.  Patient states she is sexually active with a man who has sex with other women.  She has a history of chlamydia that she did receive treatment for.                          [place] Coma Scale Score: 15                     Patient History   Past Medical History:   Diagnosis Date    Personal history of other infectious and parasitic diseases     History of sexually transmitted disease    Personal history of other mental and behavioral disorders     History of anxiety     Past Surgical History:   Procedure Laterality Date    OTHER SURGICAL HISTORY  2020    Rhinologic surgery     Family History   Problem Relation Name Age of Onset    Anxiety disorder Mother      Depression Mother      Other (substance use disorder) Mother      Other (substance use disorder) Father      Throat cancer Maternal Grandmother       Social History     Tobacco Use    Smoking status: Former     Current packs/day: 0.00     Average packs/day: 0.5 packs/day for 4.0 years (2.0 ttl pk-yrs)     Types: Cigarettes     Start date:      Quit date:      Years since quittin.3    Smokeless tobacco: Never   Vaping Use    Vaping status: Never Used   Substance Use Topics    Alcohol use: Not on file    Drug use: Never       Physical Exam   ED Triage Vitals [24 1805]   Temperature Heart Rate Respirations BP   36.8 °C (98.2 °F) (!) 103 19 121/73      Pulse Ox Temp Source Heart Rate Source Patient Position   100 % Temporal Monitor --      BP Location FiO2 (%)     -- --       Physical Exam  Vitals and nursing note  reviewed.   Constitutional:       General: She is not in acute distress.     Appearance: She is well-developed.   HENT:      Head: Normocephalic and atraumatic.      Right Ear: External ear normal.      Left Ear: External ear normal.      Nose: Nose normal.      Mouth/Throat:      Mouth: Mucous membranes are moist.      Pharynx: Oropharynx is clear.   Eyes:      Extraocular Movements: Extraocular movements intact.      Conjunctiva/sclera: Conjunctivae normal.   Neck:      Comments: Trachea midline  Cardiovascular:      Rate and Rhythm: Normal rate.      Pulses: Normal pulses.   Pulmonary:      Effort: Pulmonary effort is normal. No respiratory distress.      Breath sounds: Normal breath sounds.   Abdominal:      General: Bowel sounds are normal.      Palpations: Abdomen is soft.      Tenderness: There is no abdominal tenderness.      Hernia: There is no hernia in the right inguinal area.   Genitourinary:     Labia:         Right: No rash.       Vagina: No signs of injury and foreign body. Vaginal discharge (Small amount of discharge) present. No erythema, tenderness or bleeding.   Musculoskeletal:         General: No swelling or tenderness.      Cervical back: No tenderness.   Lymphadenopathy:      Lower Body: No right inguinal adenopathy. Left inguinal adenopathy present.   Skin:     General: Skin is warm and dry.      Capillary Refill: Capillary refill takes less than 2 seconds.   Neurological:      General: No focal deficit present.      Mental Status: She is alert and oriented to person, place, and time.   Psychiatric:         Mood and Affect: Mood normal.         Thought Content: Thought content does not include homicidal or suicidal ideation.         ED Course & MDM   ED Course as of 07/25/24 0453   Wed May 22, 2024   [birth year]28 Patient presents with vaginal itching concern for STD. Available chart reviewed. On initial assessment the patient was found non-toxic, no acute distress, vitals hemodynamically stable and  afebrile. Initial concern for STDs, yeast infection. []   2004 Trichomonas Wet Prep Reflex to PCR  Wet prep is negative []   2004 HCG, Urine: NEGATIVE  Patient's not pregnant by urine []   2033 Trichomonas Wet Prep Reflex to PCR  Negative.  These results were discussed with the patient.  Offered empiric treatment however patient will hold for final testing.    No indication for admission.  Discussed findings and diagnosis with the patient, follow-up and return to ED precautions given, patient voiced understanding, agrees with plan, questions answered, patient was discharged in stable condition. []      ED Course User Index  [] Roland Patino MD         Diagnoses as of 07/25/24 0453   Vaginal discharge       Medical Decision Making      Procedure  Procedures     Roland Patino MD  05/22/24 2047       Roland Patino MD  07/25/24 0453

## 2024-05-22 NOTE — ED TRIAGE NOTES
Patient arrived to ED from work with c/o vaginal itching and white chunky discharge. Patient reports right sided groin pain. Patient reports hx of Chlamydia and is unsure if that's what this is. Patient reports hx of yeast infections, last one in April. Patient reports being sexually active with someone she believes is having sex with other people. Patient is stable at this time.

## 2024-05-23 LAB
C TRACH RRNA SPEC QL NAA+PROBE: NEGATIVE
HOLD SPECIMEN: NORMAL
N GONORRHOEA DNA SPEC QL PROBE+SIG AMP: NEGATIVE
T VAGINALIS RRNA SPEC QL NAA+PROBE: NEGATIVE

## 2025-03-28 ENCOUNTER — TELEPHONE (OUTPATIENT)
Dept: OBSTETRICS AND GYNECOLOGY | Facility: CLINIC | Age: 30
End: 2025-03-28
Payer: MEDICAID

## 2025-03-28 NOTE — TELEPHONE ENCOUNTER
PT REQUESTING AN APPOINTMENT NEXT WEEK FOR AN IUD CONSULT. PT DOES NOT WANT TO GET PREGNANT AND WOULD LIKE AN APPOINTMENT ASAP.    PT IS A  AND CAN SCHEDULE BETWEEN 9-12.

## 2025-03-31 NOTE — TELEPHONE ENCOUNTER
Patient was advised to contact office with her period for the IUD insertion. She has had an IUD in the past, it is covered 100 percent with her insurance.